# Patient Record
Sex: FEMALE
[De-identification: names, ages, dates, MRNs, and addresses within clinical notes are randomized per-mention and may not be internally consistent; named-entity substitution may affect disease eponyms.]

---

## 2018-03-07 ENCOUNTER — HOSPITAL ENCOUNTER (EMERGENCY)
Dept: HOSPITAL 68 - ERH | Age: 38
End: 2018-03-07
Payer: COMMERCIAL

## 2018-03-07 VITALS — WEIGHT: 130 LBS | HEIGHT: 62 IN | BODY MASS INDEX: 23.92 KG/M2

## 2018-03-07 VITALS — DIASTOLIC BLOOD PRESSURE: 79 MMHG | SYSTOLIC BLOOD PRESSURE: 149 MMHG

## 2018-03-07 DIAGNOSIS — R11.2: Primary | ICD-10-CM

## 2018-03-07 DIAGNOSIS — K76.9: ICD-10-CM

## 2018-03-07 DIAGNOSIS — E87.6: ICD-10-CM

## 2018-03-07 LAB
ABSOLUTE GRANULOCYTE CT: 6.6 /CUMM (ref 1.4–6.5)
BASOPHILS # BLD: 0 /CUMM (ref 0–0.2)
BASOPHILS NFR BLD: 0.2 % (ref 0–2)
EOSINOPHIL # BLD: 0 /CUMM (ref 0–0.7)
EOSINOPHIL NFR BLD: 0 % (ref 0–5)
ERYTHROCYTE [DISTWIDTH] IN BLOOD BY AUTOMATED COUNT: 15.4 % (ref 11.5–14.5)
GRANULOCYTES NFR BLD: 78.4 % (ref 42.2–75.2)
HCT VFR BLD CALC: 33.1 % (ref 37–47)
LYMPHOCYTES # BLD: 1.1 /CUMM (ref 1.2–3.4)
MCH RBC QN AUTO: 24.9 PG (ref 27–31)
MCHC RBC AUTO-ENTMCNC: 32.5 G/DL (ref 33–37)
MCV RBC AUTO: 76.4 FL (ref 81–99)
MONOCYTES # BLD: 0.7 /CUMM (ref 0.1–0.6)
PLATELET # BLD: 219 /CUMM (ref 130–400)
PMV BLD AUTO: 8.6 FL (ref 7.4–10.4)
RED BLOOD CELL CT: 4.34 /CUMM (ref 4.2–5.4)
WBC # BLD AUTO: 8.5 /CUMM (ref 4.8–10.8)

## 2018-03-07 NOTE — ED GI/GU/ABDOMINAL COMPLAINT
History of Present Illness
 
General
Chief Complaint: General Adult
Stated Complaint: PT IS NOT ABLE TO HOLD ANYTHING DOWN
Source: patient, old records, friend
Exam Limitations: no limitations
 
Vital Signs & Intake/Output
Vital Signs & Intake/Output
 ED Intake and Output
 
 
 03/08 0000 03/07 1200
 
Intake Total 100 
 
Output Total  
 
Balance 100 
 
   
 
Intake,  
 
Patient 130 lb 
 
Weight  
 
Weight Reported by Patient 
 
Measurement  
 
Method  
 
 
 
Allergies
Coded Allergies:
naproxen (Intermediate, HIVES 03/07/18)
tramadol (Intermediate, HIVES 03/07/18)
 
Reconcile Medications
Oxycodone HCl/Acetaminophen (Percocet 5-325 MG Tablet) 5 MG-325 MG TABLET   1 
TAB PO BID PRN pain
Potassium Chloride (Klor-Con 10) 10 MEQ TABLET.ER   1 TAB PO DAILY hypokalemia
Promethazine HCl 12.5 MG TABLET   1 TAB PO Q6-8 PRN nausea
 
Triage Note:
PT TO TRIAGE WITH HER FRIEND, PT STATES THAT SHE
STARTED WITH MID EPIGASTRIC PAIN THAT RADIATES
INTO HER R SIDE YESTERDAY AT 11 AM, N/V, PT WAS
SEEN AT [facility] AND HAD CT SCAN, THEY TOLD HER
SHE HAS COLITIS AND DISCHARGED HER. PT HAS BEEN
UNABLE TO KEEP ANYTHING DOWN AND PAIN IS
INCREASING. PT VOMITTING BILE AT TRIAGE.
Triage Nurses Notes Reviewed? yes
Pregnant? n
Is pt currently breastfeeding? No
Onset: Abrupt
Duration: day(s): (2), constant
Timing: recent history
Quality/Severity: aching, cramping
Severity Numbers: 6
Location: generalized abdomen
Radiation: no radiation
Activities at Onset: none
Prior Abdominal Problems: none
Modifying Factors:
Worsens With: eating. 
Associated Symptoms: nausea/vomiting
HPI:
37-year-old female with no medical history presents the ER for evaluation with 
family and friends complaining of diffuse abdominal pain nausea and vomiting 
since yesterday morning.  She was seen at [facility] yesterday for the 
same at which time she she was told by CAT scan she has colitis and was sent 
home with antibiotics.  She denies diarrhea however is been taking Zofran 
without improvement of her nausea and vomiting.  She's been unable tolerate by 
mouth liquids.  Her only abdominal surgery is significant for a tubal ligation. 
No coughing no fever no chills no chest pain shortness of breath or urinary 
symptoms no urinary urgency frequency no vaginal bleeding or discharge. no 
hemetemsis. no black or bloody stools. 
(Simon Robin)
 
Past History
 
Travel History
Traveled to [place] past 21 day No
 
Medical History
Any Pertinent Medical History? see below for history
Neurological: NONE
EENT: NONE
Cardiovascular: NONE
Respiratory: NONE
Gastrointestinal: NONE
Hepatic: NONE
Renal: NONE
Musculoskeletal: NONE
Psychiatric: NONE
Endocrine: NONE
Blood Disorders: NONE
Cancer(s): NONE
GYN/Reproductive: NONE
 
Surgical History
Surgical History: non-contributory
 
Psychosocial History
What is your primary language English
Tobacco Use: Never used
ETOH Use: denies use
Illicit Drug Use: denies illicit drug use
 
Family History
Hx Contributory? No
(Simon Robin)
 
Review of Systems
 
Review of Systems
Constitutional:
Reports: see HPI. 
Comments
Review of systems: See HPI, All other systems negative.
Constitutional, no chills no fever, no malaise
HEENT:  no sore throat no congestion, no ear pain
Cardiovascular: No chest pain , no palpitation
Skin:  no rashes, no change in skin
Respiratory: No dyspnea no cough no  sputum 
GI: see hpi
: No dysuria No hematuria, no frequency
Muscle skeletal: No joint pain, no back pain, no neck pain
Neurologic: , no headache
Heme/endocrine: No bruising 
Immunology: No lymphadenopathy
(Simon Robin)
 
Physical Exam
 
Physical Exam
General Appearance: well developed/nourished, alert, awake
Gastrointestinal: soft, non-tender
Comments:
Well-developed well-nourished person in no acute distress
HEENT: Normal EENT exam; PERRL, EOMI,  HEAD is atraumatic. moist mucous 
membranes.  
Neck: Supple,normal range of motion 
Back: Nontender, no CVA tenderness. Full range of motion
Cardiovascular: Regular rate and rhythms no murmur
Respiratory: No respiratory distress.  Patient speaking in full complete 
sentences. Breath sounds clear to auscultation bilaterally: NO W/R/R
Abdomen: Soft, nontender nondistended, no appreciable organomegaly. Normal bowel
sounds. No rebound/guarding,
Extremity: No edema, full range of motion of extremities
Neuro: Alert oriented x3, motor sensory normal,  There were no obvious focal 
neurologic abnormalities.
Skin: No appreciable rash on exposed skin, skin is warm and dry.  No jaundice
Psych: Mood and affect is normal, memory and judgment is normal.
 
Core Measures
ACS in differential dx? No
Sepsis Present: No
Sepsis Focused Exam Completed? No
(Simon Robin)
 
Progress
Differential Diagnosis: appendicitis, biliary colic, bowel obstruction, colon 
cancer, cholecystitis, diverticulitis, ectopic pregnancy, hepatitis, hernia, 
inflamm bowel dis, intrauterine pregnancy, kidney stone, ovarian cyst, 
pancreatitis, PID/cervicitis, PUD/GERD, perforated viscous, SBO, UTI/pyelo
Plan of Care:
 Orders
 
 
Procedure Date/time Status
 
URINE PREGNANCY 03/07 1440 Complete
 
URINALYSIS 03/07 1440 Complete
 
MAGNESIUM 03/07 1440 Complete
 
LACTIC ACID 03/07 1440 Complete
 
COMPREHENSIVE METABOLIC PANEL 03/07 1440 Complete
 
CBC WITHOUT DIFFERENTIAL 03/07 1440 Complete
 
 
 Laboratory Tests
 
 
 
03/07/18 1740:
Lactic Acid Cancelled
 
03/07/18 1539:
Anion Gap 11, Estimated GFR > 60, BUN/Creatinine Ratio 24.0, Glucose 98, Lactic 
Acid 0.7, Calcium 9.0, Magnesium 1.8, Total Bilirubin 0.8, AST 17, ALT 26, 
Alkaline Phosphatase 54, Total Protein 6.7, Albumin 3.9, Globulin 2.8, Albumin/
Globulin Ratio 1.4, CBC w Diff NO MAN DIFF REQ, RBC 4.34, MCV 76.4  L, MCH 24.9 
L, MCHC 32.5  L, RDW 15.4  H, MPV 8.6, Gran % 78.4  H, Lymphocytes % 13.5  L, 
Monocytes % 7.9, Eosinophils % 0, Basophils % 0.2, Absolute Granulocytes 6.6  H,
Absolute Lymphocytes 1.1  L, Absolute Monocytes 0.7  H, Absolute Eosinophils 0, 
Absolute Basophils 0
 
03/07/18 1536:
Urinalysis LIGHT  H, Urine Color YEL, Urine Clarity HAZY  H, Urine pH 6.0, Ur 
Specific Gravity >= 1.030, Urine Protein 100  H, Urine Ketones >=80, Urine 
Nitrite NEG, Urine Bilirubin NEG@ICTO, Urine Urobilinogen 1.0, Ur Leukocyte 
Esterase NEG, Ur Microscopic SEDIMENT EXAMINED, Urine RBC 1-3, Urine WBC 1-3  H,
Ur Epithelial Cells MANY  H, Urine Bacteria MANY  H, Urine Mucus MANY  H, Urine 
Hemoglobin TRACE-LYSED, Urine Glucose NEG, Urine Pregnancy Test NEGATIVE
Labs ordered old records 3 CAT scan ordered.  Case discussed with Dr. Cee 
agrees with plan
 
 
1600 patient resting in no apparent distress she's had no further episodes of 
vomiting after being medicated with Phenergan reports pain has improved pending 
CAT scan and discussed with length all of her labs today.
 
 
1700 and discussed with the patient and her family her CAT scan results.  She 
reports her nausea is returning Phenergan IV ordered potassium IV ordered case 
discussed with Dr. Cee.  The patient's family has a history significant for 
liver cancer I discussed apparently her CAT scan findings today her primary care
physician is out of [place] and I discussed with them need for close follow up
and further monitoring including recommended MRI.  Patient is otherwise nontoxic
appearing at this time
 
 
1810- patient tolerating by mouth challenge with ginger ale with no episodes of 
nausea and dry heaving or vomiting.  I discussed with her at once again a family
plan of care she will go home with a prescription for potassium supplements 
Phenergan and Percocet.  She was instructed to follow-up with her primary care 
physician regarding incidental findings on CAT scan today regarding a liver 
lesionS. .  She will also be sent home with a water form for repeat potassium 
level which she was instructed to have done in 3 days.  Return precautions were 
discussed at [facility] she feels comfortable with plan
Diagnostic Imaging:
Viewed by Me: CT Scan.  Discussed w/RAD: CT Scan. 
Initial ED EKG: none
(Simon Robin)
 
Departure
 
Departure
Disposition: HOME OR SELF CARE
Condition: Stable
Clinical Impression
Primary Impression: Nausea & vomiting
Secondary Impressions: Hypokalemia, Liver lesion
Referrals:
Leslie Tim (PCP/Family)
 
Additional Instructions:
Phenergan for nausea.  Percocet for pain.  Potassium supplements as directed. 
Follow-up with your primary care physician this week as discussed regarding the 
incidental finding today on the CAT scan regarding your liver as you will most 
likely require further workup and imaging including possibly an MRI. Follow up 
as well for repeat potassium check. 
 
Return at anytime sooner with any concerns or worsening of your symptoms. 
 
 
Departure Forms:
Customer Survey
General Discharge Information
Prescriptions:
Current Visit Scripts
Potassium Chloride (Klor-Con 10) 1 TAB PO DAILY  
     #14 TAB 
 
Oxycodone HCl/Acetaminophen (Percocet 5-325 MG Tablet) 1 TAB PO BID PRN pain 
     #10 TAB 
 
Promethazine HCl 1 TAB PO Q6-8 PRN nausea 
     #14 TAB 
 
 
(Simon Robin)
 
PA/NP Co-Sign Statement
Statement:
ED Attending supervision documentation-
 
[] I saw and evaluated the patient. I have also reviewed all the pertinent lab 
results and diagnostic results. I agree with the findings and the plan of care 
as documented in the PA's/NP's documentation. 
 
[X] I have reviewed the ED Record and agree with the PA's/NP's documentation.
 
[] Additions or exceptions (if any) to the PAs/NP's note and plan are 
summarized below:
[]
 
(Jaye ISLAS,Riddhi)

## 2018-03-07 NOTE — CT SCAN REPORT
EXAMINATION:
CT ABDOMEN AND PELVIS WITH CONTRAST
 
CLINICAL INFORMATION:
Diffuse abdominal pain. Nausea and vomiting.
 
COMPARISON:
None
 
TECHNIQUE:
Multidetector volumetric imaging was performed of the abdomen and pelvis
following IV administration of 95 mL of Optiray 320 intravenous contrast.
Sagittal and coronal reformatted images were obtained on the technologist's
workstation.
 
DLP:
242.98 mGy-cm
 
FINDINGS:
 
LUNG BASES: The visualized lung bases are unremarkable.
 
LIVER, GALLBLADDER, AND BILIARY TREE: There are 2 lesions in the right
posterior lobe of liver. There is a strongly enhancing lesion with a
hypodense central nidus that is somewhat ill-defined margins but defined rim.
This measures 2.5 cm. Axial image 135 (3).
The second lesion posterior medial right lobe of liver in subcapsular
location measuring 3 x 2.5 x 2.6 cm is a hypodense lesion. This has sharply
marginated borders. This has density measurement of 64 Hounsfield units and
is not a hepatic cyst.
Both of these liver lesions can be further characterized with dynamic MRI
study.
No intrahepatic bile duct dilatation.
 
The gallbladder is unremarkable with no evidence of radiopaque gallstones,
gallbladder wall thickening, or obvious pericholecystic inflammatory changes.
 
 
PANCREAS: Unremarkable.
 
SPLEEN: Unremarkable.
 
ADRENAL GLANDS: Unremarkable.
 
KIDNEYS AND URETERS: The kidneys are normal in size, shape, and attenuation.
No hydronephrosis, hydroureter, or calculi seen. No perinephric stranding.
 
BLADDER: Unremarkable.
 
GASTROINTESTINAL TRACT: The small and large bowel are unremarkable. The
appendix is not identified. There is no inflammation the mesentery.
 
ABDOMINAL WALL: No significant hernia is appreciated.
 
LYMPH NODES: Normal.
 
VASCULAR: Unremarkable.
 
PELVIC VISCERA: The uterus is retroverted. There is no adnexal abnormality.
 
OSSEOUS STRUCTURES: No acute abnormality. There are small calcifications in
the nucleus pulposus of the L3-L4 vertebrae. There is small subcortical cysts
in the central inferior endplate of L2 and L3 vertebrae.
 
IMPRESSION:
 
1. No acute abnormality CT scan abdomen pelvis.
2. 2 nonspecific lesions in the right lobe of liver. These can be further
characterized with nonemergent dynamic MRI imaging.

## 2018-04-03 ENCOUNTER — HOSPITAL ENCOUNTER (EMERGENCY)
Dept: HOSPITAL 68 - ERH | Age: 38
End: 2018-04-03
Payer: COMMERCIAL

## 2018-04-03 VITALS — DIASTOLIC BLOOD PRESSURE: 78 MMHG | SYSTOLIC BLOOD PRESSURE: 128 MMHG

## 2018-04-03 VITALS — BODY MASS INDEX: 23.92 KG/M2 | HEIGHT: 62 IN | WEIGHT: 130 LBS

## 2018-04-03 DIAGNOSIS — R11.2: Primary | ICD-10-CM

## 2018-04-03 LAB
ABSOLUTE GRANULOCYTE CT: 7.8 /CUMM (ref 1.4–6.5)
BASOPHILS # BLD: 0 /CUMM (ref 0–0.2)
BASOPHILS NFR BLD: 0.3 % (ref 0–2)
EOSINOPHIL # BLD: 0 /CUMM (ref 0–0.7)
EOSINOPHIL NFR BLD: 0 % (ref 0–5)
ERYTHROCYTE [DISTWIDTH] IN BLOOD BY AUTOMATED COUNT: 15.8 % (ref 11.5–14.5)
GRANULOCYTES NFR BLD: 85.7 % (ref 42.2–75.2)
HCT VFR BLD CALC: 36.9 % (ref 37–47)
LYMPHOCYTES # BLD: 1 /CUMM (ref 1.2–3.4)
MCH RBC QN AUTO: 24.8 PG (ref 27–31)
MCHC RBC AUTO-ENTMCNC: 32 G/DL (ref 33–37)
MCV RBC AUTO: 77.4 FL (ref 81–99)
MONOCYTES # BLD: 0.2 /CUMM (ref 0.1–0.6)
PLATELET # BLD: 270 /CUMM (ref 130–400)
PMV BLD AUTO: 8.7 FL (ref 7.4–10.4)
RED BLOOD CELL CT: 4.77 /CUMM (ref 4.2–5.4)
WBC # BLD AUTO: 9.1 /CUMM (ref 4.8–10.8)

## 2018-04-03 NOTE — CT SCAN REPORT
EXAMINATION:
CT ABDOMEN AND PELVIS WITH CONTRAST
 
CLINICAL INFORMATION:
Diffuse abdominal pain. Nausea, vomiting.
 
COMPARISON:
03/07/2018.
 
TECHNIQUE:
Contiguous axial thin section helical images of the abdomen and pelvis were
performed following the administration of 95 mL of intravenous Optiray 320.
The data set was reformatted in the coronal and sagittal planes and reviewed
on an independent workstation.
 
DLP: 240 mGy-cm.
 
FINDINGS:
The visualized lung bases are clear. The visualized portions of the heart are
unremarkable.
 
The liver is of normal size and attenuation without intrahepatic biliary
ductal dilation. Within the inferior right lobe of the liver, there is an
area of increased attenuation measuring approximately 2.8 cm. This is
nonspecific, but could represent enhancement. A normal gallbladder is
identified. There is no wall thickening or discernible pericholecystic fluid.
 
The spleen, pancreas and left adrenal glands are unremarkable. There is a
low-attenuation lesion within the right adrenal gland measuring 2.8 cm.
 
Both kidneys are of normal size and attenuation without hydronephrosis or
nephrolithiasis. Following the administration of IV contrast, prompt
symmetric nephrograms are displayed.
 
There is no abdominal free fluid. There is neither mesenteric nor
retroperitoneal lymphadenopathy.
 
Normal unopacified loops of small and large bowel are identified. There is no
pelvic free fluid. The urinary bladder is unremarkable. There is neither
pelvic nor inguinal lymphadenopathy.
 
Bone windows: Neither sclerotic nor lytic bone lesions are identified.
 
IMPRESSION:
 
No evidence for acute abdominal or pelvic inflammatory or infectious
processes.
 
2.8 cm enhancing lesion within the right lobe of the liver. This is
nonspecific, but could correspond to an hemangioma and unchanged from prior
exam. Consider correlation with abdominal MRI for further tissue
characterization.
 
2.8 cm right adrenal mass. Noncontrast Hounsfield unit measurements cannot be
obtained on this postcontrast study. This is likely representative of an
adrenal adenoma, though consider correlation with abdominal MRI for further
tissue characterization.

## 2018-04-03 NOTE — ED GI/GU/ABDOMINAL COMPLAINT
History of Present Illness
 
General
Chief Complaint: Abdominal Pain/Flank Pain
Stated Complaint: SHIVER, NVD, FEVER,ABDOMINAL PAIN
Source: patient
Exam Limitations: no limitations
 
Vital Signs & Intake/Output
Vital Signs & Intake/Output
 Vital Signs
 
 
Date Time Temp Pulse Resp B/P B/P Pulse O2 O2 Flow FiO2
 
     Mean Ox Delivery Rate 
 
2055 99.5 100 18 137/80  98 Room Air  
 
 1540 98.0 112 16 132/92  98 Room Air  
 
 
 
Allergies
Coded Allergies:
naproxen (Intermediate, HIVES 18)
tramadol (Intermediate, HIVES 18)
 
Reconcile Medications
LORazepam (Ativan) 1 MG TAB   1 TAB PO TID PRN NAUSEA 
Metoclopramide HCl (Reglan) 10 MG TABLET   1 TAB PO 4 TIMES/DAY PRN NAUSEA 
     30 minutes before meals and bedtime
Ondansetron (Zofran Odt) 4 MG TAB.RAPDIS   1 TAB SL TID PRN NAUSEA 
Oxycodone HCl/Acetaminophen (Percocet 5-325 MG Tablet) 5 MG-325 MG TABLET   1 
TAB PO BID PRN pain
Potassium Chloride (Klor-Con 10) 10 MEQ TABLET.ER   1 TAB PO DAILY hypokalemia
Promethazine HCl 12.5 MG TABLET   1 TAB PO Q6-8 PRN nausea
 
Triage Note:
PT TO TRIAGE VOMITING YELLOW BILE THAT SHE STATES
HAS BEEN GOING ON SINCE THIS AM.  PT STATES SHE
WAS HERE THE BEGINING OF MARCH WITH THE SAME
THING.
PT STATES THERE WAS NOTHING WRONG WITH HER THEN
JUST "A BUG".
Triage Nurses Notes Reviewed? yes
LMP (ages 10-50): unknown
Pregnant? N
Is pt currently breastfeeding? No
Onset: Abrupt
Duration: day(s): (2), changing over time, continues in ED, getting worse
Timing: single episode today
Quality/Severity: cramping
Severity Numbers: 7
Location: generalized abdomen
Radiation: no radiation
Prior Abdominal Problems: similar symptoms
Past Sexual History: Unobtainable at this time
No Modifying Factors: none
Modifying Factors:
Worsens With: vomiting. 
Associated Symptoms: nausea/vomiting
HPI:
37-year-old female with no known past medical history positive for evaluation of
nausea vomiting and abdominal pain.  Patient states symptoms started about 2 
days ago and have been intermittently worse.  She reports multiple episodes of 
vomiting yellow material today.  She's been unable to eat or drink much.  She's 
not taking any medicine for this.  She had a similar episode about one month ago
and is evaluated here.  She never followed up.  She denies any chest pain 
shortness of breath diarrhea hematemesis or fever.  No recent travel no sick 
contacts.  She denies any alcohol use.
(Zen Garcia)
 
Past History
 
Travel History
Traveled to [place] past 21 day No
 
Medical History
Any Pertinent Medical History? see below for history
Neurological: NONE
EENT: NONE
Cardiovascular: NONE
Respiratory: NONE
Gastrointestinal: NONE
Hepatic: NONE
Renal: NONE
Musculoskeletal: NONE
Psychiatric: NONE
Endocrine: NONE
Blood Disorders: NONE
Cancer(s): NONE
GYN/Reproductive: NONE
 
Surgical History
Surgical History: non-contributory
 
Psychosocial History
What is your primary language English
Tobacco Use: Never used
ETOH Use: denies use
Illicit Drug Use: denies illicit drug use
 
Family History
Hx Contributory? No
(Zen Garcia)
 
Review of Systems
 
Review of Systems
Constitutional:
Reports: no symptoms. 
EENTM:
Reports: no symptoms. 
Respiratory:
Reports: no symptoms. 
Cardiovascular:
Reports: no symptoms. 
GI:
Reports: see HPI, abdominal pain, nausea, vomiting. 
Genitourinary:
Reports: no symptoms. 
Musculoskeletal:
Reports: no symptoms. 
Skin:
Reports: no symptoms. 
Neurological/Psychological:
Reports: no symptoms. 
Hematologic/Endocrine:
Reports: no symptoms. 
Immunologic/Allergic:
Reports: no symptoms. 
All Other Systems: Reviewed and Negative
(Zen Garcia)
 
Physical Exam
 
Physical Exam
General Appearance: well developed/nourished, no apparent distress, alert, awake
Head: atraumatic, normal appearance
Eyes:
Bilateral: normal appearance, PERRL, EOMI. 
Ears, Nose, Throat, Mouth: hearing grossly normal, moist mucous membrane
Neck: normal inspection, supple, full range of motion
Respiratory: normal breath sounds, chest non-tender, no respiratory distress, 
lungs clear
Cardiovascular: regular rate/rhythm, normal peripheral pulses
Peripheral Pulses:
2+ radial (R), 2+ radial (L)
Gastrointestinal: soft, no organomegaly, tenderness (DIFFUSE)
Back: normal inspection, normal range of motion, no vertebral tenderness
Extremities: normal range of motion
Neurologic/Psych: no motor/sensory deficits, awake, alert, oriented x 3, normal 
gait
Skin: intact, normal color, warm/dry
 
Core Measures
ACS in differential dx? No
Sepsis Present: No
Sepsis Focused Exam Completed? No
(Zen Garcia)
 
Progress
Differential Diagnosis: appendicitis, biliary colic, bowel obstruction, 
cholecystitis, diverticulitis, gastritis, kidney stone, pancreatitis, peptic 
ulcer, PUD/GERD, UTI/pyelo
Plan of Care:
 Orders
 
 
Procedure Date/time Status
 
URINALYSIS 1809 Complete
 
LIPASE 1809 Complete
 
HUMAN BETA HCG SCREEN 1809 Complete
 
COMPREHENSIVE METABOLIC PANEL 1809 Complete
 
CBC WITHOUT DIFFERENTIAL 1809 Complete
 
 
 Laboratory Tests
 
 
 
18 1940:
Urine Color YEL, Urine Clarity CLEAR, Urine pH 7.5, Ur Specific Gravity 1.020, 
Urine Protein 30  H, Urine Ketones 40  H, Urine Nitrite NEG, Urine Bilirubin NEG
, Urine Urobilinogen 1.0, Ur Leukocyte Esterase NEG, Ur Microscopic SEDIMENT 
EXAMINED, Urine RBC 1-3, Urine WBC 1-3  H, Ur Epithelial Cells FEW, Urine 
Bacteria FEW  H, Urine Mucus MANY  H, Urine Hemoglobin NEG, Urine Glucose NEG
 
18:
Anion Gap 13, Estimated GFR > 60, BUN/Creatinine Ratio 20.0, Glucose 126  H, 
Calcium 10.0, Total Bilirubin 1.2, AST 31, ALT 36, Alkaline Phosphatase 74, 
Total Protein 7.8, Albumin 4.4, Globulin 3.4, Albumin/Globulin Ratio 1.3, Lipase
67, Total Beta HCG NEGATIVE, CBC w Diff NO MAN DIFF REQ, RBC 4.77, MCV 77.4  L, 
MCH 24.8  L, MCHC 32.0  L, RDW 15.8  H, MPV 8.7, Gran % 85.7  H, Lymphocytes % 
11.3  L, Monocytes % 2.7, Eosinophils % 0, Basophils % 0.3, Absolute 
Granulocytes 7.8  H, Absolute Lymphocytes 1.0  L, Absolute Monocytes 0.2, 
Absolute Eosinophils 0, Absolute Basophils 0
 
 
Patient seen and evaluated.  She is reporting multiple episodes of vomiting 
today.  She is actively vomiting here in the ER.  We'll check basic labs and a 
CT scan.  Patient was given fluids and IV Zofran.
 
She continued to vomit despite Zofran.  She was given a dose of Phenergan.  
Patient also required doses of Reglan.  CT scan does not show any acute 
findings.  Liver lesions are redemonstrated patient was told about these on her 
last ER visit and never followed up.  Bloodwork is within normal limits 
potassium is 3.4.  Patient is still vomiting she was given a dose of IV Ativan.
 
Patient eventually began to feel better after receiving multiple medications and
Ativan.  She was given crackers and water by mouth and was able to tolerated.  
She reports feeling better and is requesting to go home.  Patient will be 
discharged home with a prescription for Ativan Zofran and Reglan.  Patient has 
oral potassium at home and advised her to continue taking this.  Advised to 
follow-up with a GI doctor as soon as possible for further evaluation of the 
liver lesions.  Brat diet.  Discussed return precautions in detail.  Patient 
agrees the plan.
 
 
Diagnostic Imaging:
Viewed by Me: CT Scan.  Discussed w/RAD: CT Scan. 
Radiology Impression: PATIENT: ELVIN CARIRLLO  MEDICAL RECORD NO: [id number] 
PRESENT AGE: 37  PATIENT ACCOUNT NO: [id number] : 80  LOCATION: [facility] 
ORDERING PHYSICIAN: Zen MORELOS     SERVICE DATE:  EXAM TYPE: CAT
- CT ABD & PELVIS W IV CONTRAST EXAMINATION: CT ABDOMEN AND PELVIS WITH CONTRAST
CLINICAL INFORMATION: Diffuse abdominal pain. Nausea, vomiting. COMPARISON: 2018. TECHNIQUE: Contiguous axial thin section helical images of the abdomen 
and pelvis were performed following the administration of 95 mL of intravenous 
Optiray 320. The data set was reformatted in the coronal and sagittal planes and
reviewed on an independent workstation. DLP: 240 mGy-cm. FINDINGS: The 
visualized lung bases are clear. The visualized portions of the heart are 
unremarkable. The liver is of normal size and attenuation without intrahepatic 
biliary ductal dilation. Within the inferior right lobe of the liver, there is 
an area of increased attenuation measuring approximately 2.8 cm. This is 
nonspecific, but could represent enhancement. A normal gallbladder is 
identified. There is no wall thickening or discernible pericholecystic fluid. 
The spleen, pancreas and left adrenal glands are unremarkable. There is a low-
attenuation lesion within the right adrenal gland measuring 2.8 cm. Both kidneys
are of normal size and attenuation without hydronephrosis or nephrolithiasis. 
Following the administration of IV contrast, prompt symmetric nephrograms are 
displayed. There is no abdominal free fluid. There is neither mesenteric nor 
retroperitoneal lymphadenopathy. Normal unopacified loops of small and large 
bowel are identified. There is no pelvic free fluid. The urinary bladder is 
unremarkable. There is neither pelvic nor inguinal lymphadenopathy. Bone windows
: Neither sclerotic nor lytic bone lesions are identified. IMPRESSION: No 
evidence for acute abdominal or pelvic inflammatory or infectious processes. 2.8
cm enhancing lesion within the right lobe of the liver. This is nonspecific, but
could correspond to an hemangioma and unchanged from prior exam. Consider 
correlation with abdominal MRI for further tissue characterization. 2.8 cm right
adrenal mass. Noncontrast Hounsfield unit measurements cannot be obtained on 
this postcontrast study. This is likely representative of an adrenal adenoma, 
though consider correlation with abdominal MRI for further tissue 
characterization. DICTATED BY: Magan Edwards MD  DATE/TIME DICTATED:18 :RAD.[email or web address]  DATE/TIME TRANSCRIBED:18 
CONFIDENTIAL, DO NOT COPY WITHOUT APPROPRIATE AUTHORIZATION.
Initial ED EKG: none
(Zen Garcia)
 
Departure
 
Departure
Disposition: HOME OR SELF CARE
Condition: Stable
Clinical Impression
Primary Impression: Nausea & vomiting
Qualifiers:  Vomiting type: unspecified Vomiting Intractability: non-intractable
Qualified Code: R11.2 - Nausea with vomiting, unspecified
Referrals:
Leslie Tim (PCP/Family)
 
Bubba ISLAS,Cain MONTEZ
 
Additional Instructions:
Rest and drink plenty of fluids.  Eat bland foods like bananas rice applesauce 
and toast.  Make a follow-up with her primary care doctor and provided GI doctor
as soon as possible.  Zofran and Reglan and ativan can be used as needed nausea 
and abdominal pain.  Monitor symptoms closely return with any concerns.
Departure Forms:
Customer Survey
General Discharge Information
Prescriptions:
Current Visit Scripts
Metoclopramide HCl (Reglan) 1 TAB PO 4 TIMES/DAY PRN NAUSEA  
     #30 TAB 
     30 minutes before meals and bedtime
 
Ondansetron (Zofran Odt) 1 TAB SL TID PRN NAUSEA  
     #15 TAB 
 
LORazepam (Ativan) 1 TAB PO TID PRN NAUSEA  
     #10 TAB 
 
 
(Zen Garcia)
 
PA/NP Co-Sign Statement
Statement:
ED Attending supervision documentation-
 
[] I saw and evaluated the patient. I have also reviewed all the pertinent lab 
results and diagnostic results. I agree with the findings and the plan of care 
as documented in the PA's/NP's documentation. 
 
[x] I have reviewed the ED Record and agree with the PA's/NP's documentation.
 
[] Additions or exceptions (if any) to the PAs/NP's note and plan are 
summarized below:
[]
 
(Allan MD,Shashi KAUR)